# Patient Record
Sex: FEMALE | Race: WHITE | Employment: FULL TIME | URBAN - METROPOLITAN AREA
[De-identification: names, ages, dates, MRNs, and addresses within clinical notes are randomized per-mention and may not be internally consistent; named-entity substitution may affect disease eponyms.]

---

## 2017-05-02 DIAGNOSIS — G35 MULTIPLE SCLEROSIS (HCC): Primary | ICD-10-CM

## 2017-05-02 RX ORDER — GLATIRAMER 40 MG/ML
40 INJECTION, SOLUTION SUBCUTANEOUS
Qty: 12 SYRINGE | Refills: 0 | Status: SHIPPED | OUTPATIENT
Start: 2017-05-03 | End: 2017-05-26

## 2017-05-02 NOTE — TELEPHONE ENCOUNTER
Spoke with Julissa Lim, pharmacist. States that they filled her last Copaxone Rx last month and are asking for a refill to be proactive. Not wanting to delay future shipments. Informed him we will sent to provider for review. Verbalized understanding.      [facility]

## 2017-05-26 DIAGNOSIS — G35 MULTIPLE SCLEROSIS (HCC): Primary | ICD-10-CM

## 2017-05-26 NOTE — TELEPHONE ENCOUNTER
Left message:    Appointment is due for refills; please assist her in making appointment and notify nurse.      Medication: Copaxone    Date of last refill: 05/02/17:  #12 with 0 refills  [place]    Last office visit:

## 2017-05-30 RX ORDER — GLATIRAMER 40 MG/ML
40 INJECTION, SOLUTION SUBCUTANEOUS
Qty: 12 SYRINGE | Refills: 0 | Status: SHIPPED | OUTPATIENT
Start: 2017-05-31 | End: 2017-07-17

## 2017-06-05 ENCOUNTER — OFFICE VISIT (OUTPATIENT)
Dept: NEUROLOGY | Facility: CLINIC | Age: 32
End: 2017-06-05

## 2017-06-05 VITALS
DIASTOLIC BLOOD PRESSURE: 70 MMHG | SYSTOLIC BLOOD PRESSURE: 120 MMHG | WEIGHT: 127 LBS | RESPIRATION RATE: 18 BRPM | HEART RATE: 80 BPM | BODY MASS INDEX: 20 KG/M2

## 2017-06-05 DIAGNOSIS — G35 MULTIPLE SCLEROSIS (HCC): Primary | ICD-10-CM

## 2017-06-05 DIAGNOSIS — H46.9 OPTIC NEURITIS: ICD-10-CM

## 2017-06-05 PROCEDURE — 99213 OFFICE O/P EST LOW 20 MIN: CPT | Performed by: OTHER

## 2017-06-05 NOTE — PROGRESS NOTES
Neurology H&P    Mckinley Kebede Patient Status:  No patient class for patient encounter    5/3/1985 MRN [id number]   Location [facility] Attending No att. providers found   Hosp Day #  PCP LYNETTE Mendiola Van     Subjective:  Mckinley Kebede extremity swelling  GI: no constipation or abdominal pain  : denies frequent urination or difficulty urinating   Heme: no new bruising, no unexplained fevers or chills  Endocrine: no unexplained weight loss or gain, no new fatigue  Musculoskeletal: denie within normal limits.  There is no midline shift or mass effect.  The basal cisterns are patent.  The gray-white matter differentiation is intact.  The craniocervical junction is unremarkable.         There are focal areas of T2/FLAIR hyperintensity in the refills today  - We discussed pregnancy and MS She will contact me prior to planning any future pregnancy or as soon as she knows if she is pregnant to discuss MS and Copaxone further  - Continue to follow up with opthalmology and PCP  - Offered 6 month fo

## 2017-06-05 NOTE — PATIENT INSTRUCTIONS
Refill policies:    • Allow 2-3 business days for refills; controlled substances may take longer.   • Contact your pharmacy at least 5 days prior to running out of medication and have them send an electronic request or submit request through the [facility] have a procedure or additional testing performed. [facility]) will contact your insurance carrier to obtain pre-certification or prior authorization.     Unfortunately, MARLEY has seen an increase in denial of payment even though the p

## 2017-06-26 ENCOUNTER — TELEPHONE (OUTPATIENT)
Dept: NEUROLOGY | Facility: CLINIC | Age: 32
End: 2017-06-26

## 2017-06-26 NOTE — TELEPHONE ENCOUNTER
Not[facility] that [email or web address] receipt confirmation. This is the pharmacy to which previous copaxone Rxs were sent.   Spoke with Brenda, who states that last fill for pt was on 5/19, and that they

## 2017-07-17 DIAGNOSIS — G35 MULTIPLE SCLEROSIS (HCC): ICD-10-CM

## 2017-07-17 RX ORDER — GLATIRAMER 40 MG/ML
40 INJECTION, SOLUTION SUBCUTANEOUS
Qty: 12 SYRINGE | Refills: 11 | Status: SHIPPED | OUTPATIENT
Start: 2017-07-17 | End: 2018-01-09

## 2017-07-17 NOTE — TELEPHONE ENCOUNTER
Pt called to state that no refills were given on her last Copaxone refill (see most recent reason for call on TE dated 6/26/17). Note that the request had initially had no refills, as pt was overdue for follow up.   Pt stated that she didn't need refills a

## 2018-01-08 NOTE — TELEPHONE ENCOUNTER
Received a call from [place] indicating that they are sending her Copaxone RX 40 mg to local [facility] number [phone]

## 2018-01-09 ENCOUNTER — TELEPHONE (OUTPATIENT)
Dept: NEUROLOGY | Facility: CLINIC | Age: 33
End: 2018-01-09

## 2018-01-09 DIAGNOSIS — G35 MULTIPLE SCLEROSIS (HCC): ICD-10-CM

## 2018-01-09 RX ORDER — GLATIRAMER 40 MG/ML
40 INJECTION, SOLUTION SUBCUTANEOUS
Qty: 12 SYRINGE | Refills: 6 | Status: SHIPPED | OUTPATIENT
Start: 2018-01-10 | End: 2018-01-09

## 2018-01-09 RX ORDER — GLATIRAMER ACETATE 40 MG/ML
40 INJECTION, SOLUTION SUBCUTANEOUS
Qty: 12 SYRINGE | Refills: 6 | Status: SHIPPED | OUTPATIENT
Start: 2018-01-10 | End: 2018-07-12

## 2018-01-09 NOTE — TELEPHONE ENCOUNTER
Copaxone prescription was refilled on 07/17/17 with 1 year refills. Sent prescription to [facility], since prescription is still valid.

## 2018-01-09 NOTE — TELEPHONE ENCOUNTER
Patient calling stating Bev Abrams informed her generic Copaxone was prescribed. Reordered to reflect to dispense as written/brand name only.

## 2018-01-09 NOTE — TELEPHONE ENCOUNTER
Patient informed she changed insurance companies and needs refill of Copaxone sent to [facility]. Patient unable to provide information from insurance prescription card (i.e. BIN/PCN/Grp).      Informed patient a prior authorization will need to be

## 2018-04-10 ENCOUNTER — TELEPHONE (OUTPATIENT)
Dept: NEUROLOGY | Facility: CLINIC | Age: 33
End: 2018-04-10

## 2018-04-10 NOTE — TELEPHONE ENCOUNTER
Per Epic review, pts LOV was on 6/5/17. Most recent Copaxone Rx was written on 1/10/18 with #12/6. This should last for 24 weeks, through the end of June. Spoke with patient and relayed above.   Pt also informed that she should contact her specialty ph

## 2018-04-10 NOTE — TELEPHONE ENCOUNTER
[place] she [place] to come back for yearly med checkup. Wants to know when rx  & when she should refill it?

## 2018-05-23 ENCOUNTER — TELEPHONE (OUTPATIENT)
Dept: NEUROLOGY | Facility: CLINIC | Age: 33
End: 2018-05-23

## 2018-05-24 NOTE — TELEPHONE ENCOUNTER
Spoke with patient and relayed that letter was ready. Pt requested letter be mailed to her home address. Letter placed in outgoing mail.

## 2018-07-12 DIAGNOSIS — G35 MULTIPLE SCLEROSIS (HCC): ICD-10-CM

## 2018-07-12 RX ORDER — GLATIRAMER ACETATE 40 MG/ML
40 INJECTION, SOLUTION SUBCUTANEOUS
Qty: 12 SYRINGE | Refills: 1 | Status: SHIPPED | OUTPATIENT
Start: 2018-07-13 | End: 2018-07-16

## 2018-07-12 NOTE — TELEPHONE ENCOUNTER
Spoke with patient who scheduled an appointment for 8/29/18. Appointment placed on hold for  to schedule.     Medication: Copaxone 40 mg/mL    Date of last refill: 1/10/18  [place]    Last office visit: 6/5/

## 2018-07-16 RX ORDER — GLATIRAMER ACETATE 40 MG/ML
40 INJECTION, SOLUTION SUBCUTANEOUS
Qty: 12 SYRINGE | Refills: 1 | Status: SHIPPED | OUTPATIENT
Start: 2018-07-16 | End: 2018-08-29

## 2018-08-29 ENCOUNTER — APPOINTMENT (OUTPATIENT)
Dept: LAB | Age: 33
End: 2018-08-29
Attending: Other
Payer: COMMERCIAL

## 2018-08-29 ENCOUNTER — OFFICE VISIT (OUTPATIENT)
Dept: NEUROLOGY | Facility: CLINIC | Age: 33
End: 2018-08-29
Payer: COMMERCIAL

## 2018-08-29 VITALS
HEART RATE: 62 BPM | SYSTOLIC BLOOD PRESSURE: 110 MMHG | RESPIRATION RATE: 14 BRPM | WEIGHT: 130 LBS | DIASTOLIC BLOOD PRESSURE: 70 MMHG | BODY MASS INDEX: 21 KG/M2

## 2018-08-29 DIAGNOSIS — G35 MULTIPLE SCLEROSIS (HCC): Primary | ICD-10-CM

## 2018-08-29 DIAGNOSIS — H46.9 OPTIC NEURITIS: ICD-10-CM

## 2018-08-29 DIAGNOSIS — G35 MULTIPLE SCLEROSIS (HCC): ICD-10-CM

## 2018-08-29 PROCEDURE — 36415 COLL VENOUS BLD VENIPUNCTURE: CPT | Performed by: OTHER

## 2018-08-29 PROCEDURE — 99213 OFFICE O/P EST LOW 20 MIN: CPT | Performed by: OTHER

## 2018-08-29 PROCEDURE — 82652 VIT D 1 25-DIHYDROXY: CPT | Performed by: OTHER

## 2018-08-29 RX ORDER — GLATIRAMER ACETATE 40 MG/ML
40 INJECTION, SOLUTION SUBCUTANEOUS
Qty: 12 SYRINGE | Refills: 12 | Status: SHIPPED | OUTPATIENT
Start: 2018-08-29 | End: 2019-05-13

## 2018-08-29 NOTE — PROGRESS NOTES
Neurology H&P    Deepika Hicks Patient Status:  No patient class for patient encounter    5/3/1985 MRN [id number]   Location [facility] Attending No att. providers found   Hosp Day #  PCP LYNETTE Gamble     Subjective:  Deepika Hicks denies back pain, denies joint pain  Psych: denies depression   Skin: denies any new masses, rashes, lumps or bruising    Objective/Physical Exam:    Vital Signs: There were no vitals taken for this visit.     HEENT: moist mucus membranes  Cardiovascular: in the supra-and infratentorial white matter which are likely related to the patient's history of demyelinating disease. No definite enhancing plaque is seen to suggest active demyelination. There is no    significant overall brain parenchymal volume loss. [place], [place]  Neurology

## 2018-08-30 LAB — 1,25-DIHYDROXYVITAMIN D: 49.1 PG/ML

## 2018-09-10 ENCOUNTER — OFFICE VISIT (OUTPATIENT)
Dept: HEMATOLOGY/ONCOLOGY | Facility: HOSPITAL | Age: 33
End: 2018-09-10
Attending: Other
Payer: COMMERCIAL

## 2018-09-10 ENCOUNTER — TELEPHONE (OUTPATIENT)
Dept: NEUROLOGY | Facility: CLINIC | Age: 33
End: 2018-09-10

## 2018-09-10 ENCOUNTER — TELEPHONE (OUTPATIENT)
Dept: HEMATOLOGY/ONCOLOGY | Facility: HOSPITAL | Age: 33
End: 2018-09-10

## 2018-09-10 VITALS
WEIGHT: 127.19 LBS | TEMPERATURE: 99 F | HEART RATE: 71 BPM | RESPIRATION RATE: 18 BRPM | OXYGEN SATURATION: 99 % | DIASTOLIC BLOOD PRESSURE: 82 MMHG | BODY MASS INDEX: 20 KG/M2 | SYSTOLIC BLOOD PRESSURE: 132 MMHG

## 2018-09-10 DIAGNOSIS — H46.9 OPTIC NEURITIS: Primary | ICD-10-CM

## 2018-09-10 DIAGNOSIS — G35 MULTIPLE SCLEROSIS (HCC): ICD-10-CM

## 2018-09-10 DIAGNOSIS — G35 MULTIPLE SCLEROSIS (HCC): Primary | ICD-10-CM

## 2018-09-10 PROCEDURE — 96365 THER/PROPH/DIAG IV INF INIT: CPT

## 2018-09-10 RX ORDER — PREDNISONE 20 MG/1
TABLET ORAL
Qty: 30 TABLET | Refills: 0 | Status: SHIPPED | OUTPATIENT
Start: 2018-09-10 | End: 2019-08-27 | Stop reason: ALTCHOICE

## 2018-09-10 NOTE — TELEPHONE ENCOUNTER
Spoke with patient who states she has been experiencing severe left eye pain and foggy vision since Friday 9/7/18  Patient stated her left eye pain has been worsening since Friday and  is worse today.    Patient states she previously had Optic neuritis in h

## 2018-09-10 NOTE — PROGRESS NOTES
Education Record    Learner:  Patient and Spouse    Disease / Diagnosis: MS Exacerbation Optic Neuritis    Barriers / Limitations:  None   Comments:    Method:  Brief focused and Reinforcement   Comments:    General Topics:  Plan of care reviewed   Comment

## 2018-09-10 NOTE — TELEPHONE ENCOUNTER
Received a call from Dr. Chavez [place] office for the patient to receive Solumedrol 1 gram x 5 days. Spoke to patient's  and was given appointment for today at 36 and will ne given the rest of her appointment when she gets here.  Spouse verbalized und

## 2018-09-10 NOTE — TELEPHONE ENCOUNTER
Called patient Kellee Grandchild [phone] and spoke with [facility] for codes  no authorization or predetermination is needed. The [id number] I have to talk to the [facility] Department ([phone])     Call reference #[id number].  Time on call 30:47    I was tr

## 2018-09-10 NOTE — TELEPHONE ENCOUNTER
I called Ms. Vivar on the phone today,. She states that on Saturday (2 days ago). She states that her L eye has painful eye movements and blurry vision.  I asked that she go to ED for admission and 3-5 days of IV solumedrol (1g per day) treatment, She st

## 2018-09-10 NOTE — TELEPHONE ENCOUNTER
Referral placed for IV Solumedrol 1 g over 5 days at [facility]. PA notified to initiate authorization.  Once authorized need to enter Therapy plan and notify Charge nurse at [facility] to schedule patient if possible to start today or tomorr

## 2018-09-10 NOTE — TELEPHONE ENCOUNTER
Therapy plan placed for IV Solumedrol 1 g for 5 days. Siddharth Yeh RN @ [phone] [phone] notified. Verbalized understanding. She will call patient to start infusions today.        Left a detailed message on Rohan (patient's 's) confide

## 2018-09-10 NOTE — TELEPHONE ENCOUNTER
Patient's  informed of prednisone taper to be started after Solumedrol infusions. He verbalized understanding. Medication sent to [facility].  Receipt confirmed by pharmacy (9/10/2018 11:15 AM CDT)

## 2018-09-11 ENCOUNTER — OFFICE VISIT (OUTPATIENT)
Dept: HEMATOLOGY/ONCOLOGY | Facility: HOSPITAL | Age: 33
End: 2018-09-11
Attending: Other
Payer: COMMERCIAL

## 2018-09-11 ENCOUNTER — TELEPHONE (OUTPATIENT)
Dept: NEUROLOGY | Facility: CLINIC | Age: 33
End: 2018-09-11

## 2018-09-11 VITALS
HEART RATE: 78 BPM | SYSTOLIC BLOOD PRESSURE: 129 MMHG | TEMPERATURE: 99 F | OXYGEN SATURATION: 100 % | RESPIRATION RATE: 16 BRPM | DIASTOLIC BLOOD PRESSURE: 75 MMHG

## 2018-09-11 DIAGNOSIS — H46.9 OPTIC NEURITIS: Primary | ICD-10-CM

## 2018-09-11 DIAGNOSIS — G35 MULTIPLE SCLEROSIS (HCC): ICD-10-CM

## 2018-09-11 PROCEDURE — 96365 THER/PROPH/DIAG IV INF INIT: CPT

## 2018-09-11 NOTE — PROGRESS NOTES
Education Record    Learner:  Patient    Disease / Diagnosis: MS - solumedrol infusion #2 of 3 today.      Barriers / Limitations:  None   Comments:    Method:  Brief focused   Comments:    General Topics:  Plan of care reviewed   Comments:    Outcome:  Janette

## 2018-09-12 ENCOUNTER — OFFICE VISIT (OUTPATIENT)
Dept: HEMATOLOGY/ONCOLOGY | Facility: HOSPITAL | Age: 33
End: 2018-09-12
Attending: Other
Payer: COMMERCIAL

## 2018-09-12 VITALS
HEART RATE: 72 BPM | TEMPERATURE: 98 F | DIASTOLIC BLOOD PRESSURE: 76 MMHG | SYSTOLIC BLOOD PRESSURE: 121 MMHG | RESPIRATION RATE: 16 BRPM

## 2018-09-12 DIAGNOSIS — H46.9 OPTIC NEURITIS: Primary | ICD-10-CM

## 2018-09-12 DIAGNOSIS — G35 MULTIPLE SCLEROSIS (HCC): ICD-10-CM

## 2018-09-12 PROCEDURE — 96365 THER/PROPH/DIAG IV INF INIT: CPT

## 2018-09-12 NOTE — PROGRESS NOTES
Education Record    Learner:  Patient    Disease / Diagnosis:MS    Barriers / Limitations:  None   Comments:    Method:  Discussion   Comments:    General Topics:  Medication, Side effects and symptom management, Plan of care reviewed and Fall risk and pre

## 2018-09-13 ENCOUNTER — OFFICE VISIT (OUTPATIENT)
Dept: HEMATOLOGY/ONCOLOGY | Facility: HOSPITAL | Age: 33
End: 2018-09-13
Attending: Other
Payer: COMMERCIAL

## 2018-09-13 VITALS
TEMPERATURE: 98 F | RESPIRATION RATE: 18 BRPM | OXYGEN SATURATION: 100 % | SYSTOLIC BLOOD PRESSURE: 119 MMHG | DIASTOLIC BLOOD PRESSURE: 78 MMHG | HEART RATE: 74 BPM

## 2018-09-13 DIAGNOSIS — H46.9 OPTIC NEURITIS: Primary | ICD-10-CM

## 2018-09-13 DIAGNOSIS — G35 MULTIPLE SCLEROSIS (HCC): ICD-10-CM

## 2018-09-13 PROCEDURE — 96365 THER/PROPH/DIAG IV INF INIT: CPT

## 2018-09-13 NOTE — PROGRESS NOTES
Pt tolerated solumedrol 1 gram infusion well today without incident or complaint.      Education Record    Learner:  Patient    Disease / Diagnosis: MS; optic neuritis     Barriers / Limitations:  None   Comments:    Method:  Discussion   Comments:    Gener

## 2018-09-14 ENCOUNTER — OFFICE VISIT (OUTPATIENT)
Dept: HEMATOLOGY/ONCOLOGY | Facility: HOSPITAL | Age: 33
End: 2018-09-14
Attending: Other
Payer: COMMERCIAL

## 2018-09-14 VITALS
DIASTOLIC BLOOD PRESSURE: 74 MMHG | HEART RATE: 71 BPM | RESPIRATION RATE: 16 BRPM | SYSTOLIC BLOOD PRESSURE: 120 MMHG | TEMPERATURE: 97 F | OXYGEN SATURATION: 100 %

## 2018-09-14 DIAGNOSIS — G35 MULTIPLE SCLEROSIS (HCC): ICD-10-CM

## 2018-09-14 DIAGNOSIS — H46.9 OPTIC NEURITIS: Primary | ICD-10-CM

## 2018-09-14 PROCEDURE — 96365 THER/PROPH/DIAG IV INF INIT: CPT

## 2018-09-14 NOTE — PROGRESS NOTES
Education Record    Learner:  Patient    Disease / Diagnosis: Patient here for last day of solumedrol infusion for MS.      Barriers / Limitations:  None   Comments:    Method:  Brief focused   Comments:    General Topics:  Plan of care reviewed   Comments:

## 2018-09-28 ENCOUNTER — HOSPITAL ENCOUNTER (OUTPATIENT)
Dept: MRI IMAGING | Facility: HOSPITAL | Age: 33
Discharge: HOME | End: 2018-09-28
Attending: Other
Payer: COMMERCIAL

## 2018-09-28 DIAGNOSIS — G35 MULTIPLE SCLEROSIS (HCC): ICD-10-CM

## 2018-09-28 PROCEDURE — 70543 MRI ORBT/FAC/NCK W/O &W/DYE: CPT | Performed by: OTHER

## 2018-09-28 PROCEDURE — 70553 MRI BRAIN STEM W/O & W/DYE: CPT | Performed by: OTHER

## 2018-09-28 PROCEDURE — A9575 INJ GADOTERATE MEGLUMI 0.1ML: HCPCS | Performed by: OTHER

## 2018-10-02 ENCOUNTER — TELEPHONE (OUTPATIENT)
Dept: NEUROLOGY | Facility: CLINIC | Age: 33
End: 2018-10-02

## 2018-10-02 NOTE — TELEPHONE ENCOUNTER
[facility] message sent to patient with the below results. ----- Message from Korey Donato DO sent at 10/2/2018 12:10 PM CDT -----  MRI Brain reviewed. No new enhancing lesions.  This is actually improved form her previous exam

## 2019-04-02 ENCOUNTER — TELEPHONE (OUTPATIENT)
Dept: INTERNAL MEDICINE CLINIC | Facility: CLINIC | Age: 34
End: 2019-04-02

## 2019-04-02 NOTE — TELEPHONE ENCOUNTER
Per Epic review, patient is active on MyChart. Per notes below, VIT D level is WNL. Patient notified.

## 2019-05-06 ENCOUNTER — TELEPHONE (OUTPATIENT)
Dept: NEUROLOGY | Facility: CLINIC | Age: 34
End: 2019-05-06

## 2019-05-06 DIAGNOSIS — G35 MULTIPLE SCLEROSIS (HCC): Primary | ICD-10-CM

## 2019-05-06 RX ORDER — GLATIRAMER 40 MG/ML
40 INJECTION, SOLUTION SUBCUTANEOUS
Qty: 12 SYRINGE | Refills: 3 | Status: CANCELLED | OUTPATIENT
Start: 2019-05-06 | End: 2019-06-03

## 2019-05-06 NOTE — TELEPHONE ENCOUNTER
LOV 8/29/18, to return in 1 year. Glatiramer 40mg pended for review. Prednisone was given just as a taper back in 9/2018 after Solumedrol infusion, not maintenance medication. LMTCB with , Nito (ok per HIPAA).     -can pend generic Copaxone a

## 2019-05-07 NOTE — TELEPHONE ENCOUNTER
Patient's  called MARLEY back (ok per HIPPA). Patient has new insurance and will update MARLEY as soon as he is home. Informed patient's spouse that RNs will speak to provider regarding brand Copaxone vs generic.  MARLEY will have to discuss with new insur

## 2019-05-10 RX ORDER — GLATIRAMER 40 MG/ML
40 INJECTION, SOLUTION SUBCUTANEOUS
Qty: 9 SYRINGE | Refills: 5 | Status: SHIPPED | OUTPATIENT
Start: 2019-05-10 | End: 2019-05-13

## 2019-05-10 NOTE — TELEPHONE ENCOUNTER
RN attempted to start PA on CMM and patient reported numbers for new insurance were not allowing an online submission. Called [facility] at [phone] and initiated a verbal PA. PA reference # is [id number].     Per PA team at [facility] - member # should

## 2019-05-10 NOTE — TELEPHONE ENCOUNTER
Attempted PA for brand Copaxone was denied. Submitted claim for generic Glatiramer was approved for 5 years through 5/7/2024. [address] [id number]. Plan covers 9 syringes in 21 day period. Pended order to get patient to annual visit in August 2019.     Per [employer]

## 2019-05-13 RX ORDER — GLATIRAMER 40 MG/ML
40 INJECTION, SOLUTION SUBCUTANEOUS
Qty: 12 SYRINGE | Refills: 5 | COMMUNITY
Start: 2019-05-13 | End: 2019-10-28

## 2019-05-13 NOTE — TELEPHONE ENCOUNTER
Received call from [facility] stating that Rx was for 9 syringes ( see notes below, per PA only would cover 9 syringes). [facility] informed of PA notification and states they will not break a box and will need new RX and will discuss with patient's insurance.  Mar

## 2019-08-27 ENCOUNTER — OFFICE VISIT (OUTPATIENT)
Dept: NEUROLOGY | Facility: CLINIC | Age: 34
End: 2019-08-27
Payer: COMMERCIAL

## 2019-08-27 VITALS
WEIGHT: 128 LBS | DIASTOLIC BLOOD PRESSURE: 74 MMHG | RESPIRATION RATE: 16 BRPM | BODY MASS INDEX: 21 KG/M2 | SYSTOLIC BLOOD PRESSURE: 112 MMHG | HEART RATE: 72 BPM

## 2019-08-27 DIAGNOSIS — G35 MULTIPLE SCLEROSIS (HCC): Primary | ICD-10-CM

## 2019-08-27 PROCEDURE — 99213 OFFICE O/P EST LOW 20 MIN: CPT | Performed by: OTHER

## 2019-08-27 NOTE — PROGRESS NOTES
Neurology H&P    Marc Infante Patient Status:  No patient class for patient encounter    5/3/1985 MRN [id number]   Location ED [facility] Attending No att. providers found   Hosp Day #  PCP LYNETTE Arreola     Subjective:  Marc Infante SOB, no new cough  CV: no chest pain, no new lower extremity swelling  GI: no constipation or abdominal pain  : denies frequent urination or difficulty urinating   Heme: no new bruising, no unexplained fevers or chills  Endocrine: no unexplained weight l    attempted to decrease the extent of artifact.      The ventricles and sulci are within normal limits.  There is no midline shift or mass effect.  The basal cisterns are patent.  The gray-white matter differentiation is intact.  The craniocervical juncti 8/2018    Plan:  1. Relapsing remitting MS w/ 2 bouts of R eye ON. Aquaporin-4 neg. - Continue Copaxone 40mg 3 times weekly. - She is interested in switching to an oral MS agent.        - Information given  - Continue to follow up with opthalmology and P

## 2019-10-28 DIAGNOSIS — G35 MULTIPLE SCLEROSIS (HCC): ICD-10-CM

## 2019-10-29 RX ORDER — GLATIRAMER 40 MG/ML
INJECTION, SOLUTION SUBCUTANEOUS
Qty: 12 SYRINGE | Refills: 5 | Status: SHIPPED | OUTPATIENT
Start: 2019-10-29 | End: 2020-01-09

## 2019-10-29 NOTE — TELEPHONE ENCOUNTER
Medication: GLATIRAMER ACETATE 40 MG/ML Subcutaneous Solution Prefilled Syringe    Date of last refill: 05/13/19 (#12 syringe/5)  [place]    Last office visit: 8/27/2019  Due back to clinic per last office note:  Amanda Metzger

## 2019-12-02 ENCOUNTER — TELEPHONE (OUTPATIENT)
Dept: NEUROLOGY | Facility: CLINIC | Age: 34
End: 2019-12-02

## 2019-12-02 NOTE — TELEPHONE ENCOUNTER
Fax received from [facility] stating they have been unable to reach patient to fill Glatiramer. Message left on VM for patient to call [facility].

## 2019-12-26 ENCOUNTER — TELEPHONE (OUTPATIENT)
Dept: NEUROLOGY | Facility: CLINIC | Age: 34
End: 2019-12-26

## 2019-12-26 NOTE — TELEPHONE ENCOUNTER
Incoming fax asking for refill. Per Epic review, given 6 month refill on 10/29/19, should still have refills left. Faxed info back to pharmacy. Confirmation received.

## 2020-01-09 DIAGNOSIS — G35 MULTIPLE SCLEROSIS (HCC): ICD-10-CM

## 2020-01-09 RX ORDER — GLATIRAMER 40 MG/ML
40 INJECTION, SOLUTION SUBCUTANEOUS
Qty: 12 SYRINGE | Refills: 5 | Status: SHIPPED | OUTPATIENT
Start: 2020-01-10 | End: 2020-07-09

## 2020-01-09 NOTE — TELEPHONE ENCOUNTER
[facility] calling to request refill of GLATIRAMER ACETATE 40 MG/ML Subcutaneous Solution Prefilled Syringe. Last refill was sent to [facility] on 10/29/2019 for 12 syringe with 5 refills. [facility] will be handling refills for pt.     Order pended and

## 2020-01-20 ENCOUNTER — TELEPHONE (OUTPATIENT)
Dept: NEUROLOGY | Facility: CLINIC | Age: 35
End: 2020-01-20

## 2020-07-08 DIAGNOSIS — G35 MULTIPLE SCLEROSIS (HCC): ICD-10-CM

## 2020-07-09 RX ORDER — GLATIRAMER 40 MG/ML
INJECTION, SOLUTION SUBCUTANEOUS
Qty: 12 SYRINGE | Refills: 2 | Status: SHIPPED | OUTPATIENT
Start: 2020-07-09 | End: 2020-09-25

## 2020-07-09 NOTE — TELEPHONE ENCOUNTER
Medication: GLATIRAMER ACETATE 40 MG/ML Subcutaneous Solution Prefilled Syringe    Date of last refill: 01/10/2020 (#12 syringe/5)  [place]    Last office visit: 08/27/19  Due back to clinic per last office note:  Ar

## 2020-09-25 DIAGNOSIS — G35 MULTIPLE SCLEROSIS (HCC): ICD-10-CM

## 2020-09-25 RX ORDER — GLATIRAMER 40 MG/ML
40 INJECTION, SOLUTION SUBCUTANEOUS
Qty: 12 SYRINGE | Refills: 1 | Status: SHIPPED | OUTPATIENT
Start: 2020-09-25 | End: 2020-11-11

## 2020-09-25 NOTE — TELEPHONE ENCOUNTER
Kareem't scheduled on 11/4.     Medication: Cruz Ortega    Date of last refill: 7/9/20 (#12/2)  [place]     Last office visit: 8/27/19  Due back to clinic per last office note:  1 year  Date next office visit scheduled:  No f

## 2020-11-04 ENCOUNTER — TELEPHONE (OUTPATIENT)
Dept: NEUROLOGY | Facility: CLINIC | Age: 35
End: 2020-11-04

## 2020-11-11 DIAGNOSIS — G35 MULTIPLE SCLEROSIS (HCC): ICD-10-CM

## 2020-11-11 RX ORDER — GLATIRAMER 40 MG/ML
INJECTION, SOLUTION SUBCUTANEOUS
Qty: 12 SYRINGE | Refills: 1 | Status: SHIPPED | OUTPATIENT
Start: 2020-11-11 | End: 2020-12-07

## 2020-11-11 NOTE — TELEPHONE ENCOUNTER
Medication: GLATIRAMER ACETATE 40 MG/ML Subcutaneous Solution Prefilled Syringe    Date of last refill: 09/25/2020 (#12 syringe/1)  [place]    Last office visit: 08/27/19  Due back to clinic per last office note:  Ar

## 2020-12-07 ENCOUNTER — TELEMEDICINE (OUTPATIENT)
Dept: NEUROLOGY | Facility: CLINIC | Age: 35
End: 2020-12-07
Payer: COMMERCIAL

## 2020-12-07 DIAGNOSIS — H46.9 OPTIC NEURITIS: Primary | ICD-10-CM

## 2020-12-07 DIAGNOSIS — G35 MULTIPLE SCLEROSIS (HCC): ICD-10-CM

## 2020-12-07 PROCEDURE — 99213 OFFICE O/P EST LOW 20 MIN: CPT | Performed by: OTHER

## 2020-12-07 RX ORDER — GLATIRAMER 40 MG/ML
1 INJECTION, SOLUTION SUBCUTANEOUS
Qty: 12 SYRINGE | Refills: 11 | Status: SHIPPED | OUTPATIENT
Start: 2020-12-07 | End: 2020-12-09

## 2020-12-07 NOTE — PROGRESS NOTES
This patient verbally consents a video and audio Check-In service today. Patient understands and accepts financial responsibility for any deductible, co-insurance and/or co-pays associated with this service.       Neurology H&P    Jayy Valladares No      Family History:  Family History   Problem Relation Age of Onset   • Hypertension Mother        ROS:  10 point ROS completed and pertinent positives in HPI    Objective/Physical Exam: exam limited by video visit    Vital Signs:   There were no vitals ethmoid mucosal thickening.  The remainder of the visualized paranasal sinuses and mastoid air cells are unremarkable.  The expected major intracranial flow voids are present.      The orbits are limited in evaluation due to extensive susceptibility artifac spent on reviewing labs, medications, radiology tests and decision making.   Appropriate medical decision-making and tests are ordered as detailed in the plan of care above.”      Clemencia Dan, DO  Neurology

## 2020-12-09 ENCOUNTER — TELEPHONE (OUTPATIENT)
Dept: NEUROLOGY | Facility: CLINIC | Age: 35
End: 2020-12-09

## 2020-12-09 DIAGNOSIS — G35 MULTIPLE SCLEROSIS (HCC): ICD-10-CM

## 2020-12-09 RX ORDER — GLATIRAMER 40 MG/ML
1 INJECTION, SOLUTION SUBCUTANEOUS
Qty: 12 SYRINGE | Refills: 11 | COMMUNITY
Start: 2020-12-09 | End: 2021-11-22

## 2020-12-09 NOTE — TELEPHONE ENCOUNTER
Spoke with [facility] pharmacist  Radha Grijalva) and informed her the sig should be Administer 1 ml 3 times weekly. Sig updated in Epic historically.

## 2021-01-12 ENCOUNTER — V-VISIT (OUTPATIENT)
Dept: INTERNAL MEDICINE | Age: 36
End: 2021-01-12

## 2021-01-12 ENCOUNTER — E-ADVICE (OUTPATIENT)
Dept: INTERNAL MEDICINE | Age: 36
End: 2021-01-12

## 2021-01-12 ENCOUNTER — TELEPHONE (OUTPATIENT)
Dept: INTERNAL MEDICINE | Age: 36
End: 2021-01-12

## 2021-01-12 DIAGNOSIS — E55.9 VITAMIN D DEFICIENCY: ICD-10-CM

## 2021-01-12 DIAGNOSIS — G35 MULTIPLE SCLEROSIS (CMD): Primary | ICD-10-CM

## 2021-01-12 PROBLEM — H46.9 OPTIC NEURITIS: Status: ACTIVE | Noted: 2021-01-12

## 2021-01-12 PROBLEM — F32.A DEPRESSION: Status: ACTIVE | Noted: 2021-01-12

## 2021-01-12 PROCEDURE — 99202 OFFICE O/P NEW SF 15 MIN: CPT | Performed by: FAMILY MEDICINE

## 2021-01-12 RX ORDER — CALCIUM CITRATE/VITAMIN D3 200MG-6.25
TABLET ORAL
COMMUNITY

## 2021-01-12 RX ORDER — GLATIRAMER 40 MG/ML
INJECTION, SOLUTION SUBCUTANEOUS
COMMUNITY
Start: 2020-12-16

## 2021-01-12 SDOH — HEALTH STABILITY: MENTAL HEALTH: HOW OFTEN DO YOU HAVE A DRINK CONTAINING ALCOHOL?: NOT ASKED

## 2021-01-12 ASSESSMENT — PATIENT HEALTH QUESTIONNAIRE - PHQ9
2. FEELING DOWN, DEPRESSED OR HOPELESS: NOT AT ALL
CLINICAL INTERPRETATION OF PHQ2 SCORE: NO FURTHER SCREENING NEEDED
1. LITTLE INTEREST OR PLEASURE IN DOING THINGS: NOT AT ALL
CLINICAL INTERPRETATION OF PHQ9 SCORE: NO FURTHER SCREENING NEEDED
SUM OF ALL RESPONSES TO PHQ9 QUESTIONS 1 AND 2: 0
SUM OF ALL RESPONSES TO PHQ9 QUESTIONS 1 AND 2: 0

## 2021-01-19 ENCOUNTER — E-ADVICE (OUTPATIENT)
Dept: INTERNAL MEDICINE | Age: 36
End: 2021-01-19

## 2021-01-20 ENCOUNTER — TELEPHONE (OUTPATIENT)
Dept: NEUROLOGY | Facility: CLINIC | Age: 36
End: 2021-01-20

## 2021-05-24 ENCOUNTER — TELEPHONE (OUTPATIENT)
Dept: NEUROLOGY | Facility: CLINIC | Age: 36
End: 2021-05-24

## 2021-05-24 NOTE — TELEPHONE ENCOUNTER
Patient informed there is no contra-indication of patient taking Glatiramer Acetate while pregnant per MS web site but patient requests Dr Radha Ashby input.

## 2021-05-24 NOTE — TELEPHONE ENCOUNTER
Pt has been taking Glatiramer Acetate 40 MG/ML for several years, just found out over the weekend that she is pregnant. Her next dose is this evening, does she have to stop taking it? What can she do instead? Please advise.

## 2021-05-24 NOTE — TELEPHONE ENCOUNTER
There are studies that have retrospectively looked at Copaxone during pregnancy.  Overall with more azul 7000 woman exposed to copaxone during pregnancy  And the data suggested that they were not at higher risk for congenital anomalies than what is expected

## 2021-08-11 ENCOUNTER — TELEPHONE (OUTPATIENT)
Dept: NEUROLOGY | Facility: CLINIC | Age: 36
End: 2021-08-11

## 2021-08-11 NOTE — TELEPHONE ENCOUNTER
Patient notified that [facility] has been trying to reach her to schedule Glatiramer delivery.  Patient states she will call them,

## 2021-11-21 DIAGNOSIS — G35 MULTIPLE SCLEROSIS (HCC): ICD-10-CM

## 2021-11-22 RX ORDER — GLATIRAMER 40 MG/ML
1 INJECTION, SOLUTION SUBCUTANEOUS
Qty: 12 EACH | Refills: 0 | Status: SHIPPED | OUTPATIENT
Start: 2021-11-22 | End: 2021-12-02

## 2021-11-22 NOTE — TELEPHONE ENCOUNTER
Sent patient [employer] customer insightt message that appointment needed for further refills.      Medication: GLATIRAMER ACETATE 40 MG/ML Subcutaneous Solution Prefilled Syringe     Date of last refill: 12/9/20 (#12/0)  [place]     Last

## 2021-11-23 ENCOUNTER — TELEPHONE (OUTPATIENT)
Dept: NEUROLOGY | Facility: CLINIC | Age: 36
End: 2021-11-23

## 2021-12-02 ENCOUNTER — PATIENT MESSAGE (OUTPATIENT)
Dept: NEUROLOGY | Facility: CLINIC | Age: 36
End: 2021-12-02

## 2021-12-02 DIAGNOSIS — G35 MULTIPLE SCLEROSIS (HCC): ICD-10-CM

## 2021-12-02 NOTE — TELEPHONE ENCOUNTER
Kasie Vivar  Background, [place] 5 minutes ago (9:22 AM)     AS      Hello. I scheduled an appointment in February. That was the first doctor's availability. However, I have no more refil prescription for my medication.   I just want to make sure t

## 2021-12-02 NOTE — TELEPHONE ENCOUNTER
From: Monika Haider  Sent: 11/22/2021 9:22 AM CST  Subject: Refill Request    Angela Montoya,    We received your refill request for GLATIRAMER ACETATE 40 MG/ML Subcutaneous Solution Prefilled Syringe.  Also, we did see that Dr. Mary Higgins

## 2021-12-03 RX ORDER — GLATIRAMER 40 MG/ML
1 INJECTION, SOLUTION SUBCUTANEOUS
Qty: 12 EACH | Refills: 1 | Status: SHIPPED | OUTPATIENT
Start: 2021-12-03 | End: 2021-12-07

## 2021-12-07 ENCOUNTER — TELEPHONE (OUTPATIENT)
Dept: NEUROLOGY | Facility: CLINIC | Age: 36
End: 2021-12-07

## 2021-12-07 DIAGNOSIS — G35 MULTIPLE SCLEROSIS (HCC): ICD-10-CM

## 2021-12-07 RX ORDER — GLATIRAMER 40 MG/ML
1 INJECTION, SOLUTION SUBCUTANEOUS
Qty: 12 EACH | Refills: 1 | Status: SHIPPED | OUTPATIENT
Start: 2021-12-08 | End: 2021-12-14

## 2021-12-07 NOTE — TELEPHONE ENCOUNTER
pt states she is neding a refill on the Glatiramer Acetate 40 MG/ML Subcutaneous Solution Prefilled Syringe

## 2021-12-14 RX ORDER — GLATIRAMER 40 MG/ML
1 INJECTION, SOLUTION SUBCUTANEOUS
Qty: 12 EACH | Refills: 2 | Status: SHIPPED | OUTPATIENT
Start: 2021-12-15

## 2021-12-14 NOTE — TELEPHONE ENCOUNTER
Pt does not have an appt until February and not enough refills have been sent; pt will need at least 2 refills until next appt; pt would like a call to discuss; pls call her at [phone]

## 2021-12-14 NOTE — TELEPHONE ENCOUNTER
1 additional refill added to previous Rx Glatiramer sent on 12/8/21 per verbal order. Patient notified . PA will be required 1/21/2022. Will have chart go into nurse call pool at that time.

## 2021-12-20 DIAGNOSIS — G35 MULTIPLE SCLEROSIS (HCC): ICD-10-CM

## 2021-12-20 NOTE — TELEPHONE ENCOUNTER
Spoke with the pharmacy who states to disregard the patient has refills on file, this would keep an extra medication on file.        Medication: GLATIRAMER ACETATE 40 MG/ML Subcutaneous Solution Prefilled Syringe     Date of last refill: 12/15/2021 (#12 eac

## 2021-12-21 RX ORDER — GLATIRAMER 40 MG/ML
1 INJECTION, SOLUTION SUBCUTANEOUS
Qty: 12 EACH | Refills: 0 | OUTPATIENT
Start: 2021-12-22

## 2022-01-10 ENCOUNTER — PATIENT MESSAGE (OUTPATIENT)
Dept: NEUROLOGY | Facility: CLINIC | Age: 37
End: 2022-01-10

## 2022-01-19 ENCOUNTER — PATIENT MESSAGE (OUTPATIENT)
Dept: NEUROLOGY | Facility: CLINIC | Age: 37
End: 2022-01-19

## 2022-01-19 NOTE — TELEPHONE ENCOUNTER
From: Portillo Worthington  To: Kasie Garcia DO  Sent: 1/19/2022 12:35 PM CST  Subject: Breastfeeding and Glatiramer     Good afternoon. I delivered a baby yesterday, January 18th.  And I do have a question: is Glatiramer safe for the baby while breast

## 2022-01-20 ENCOUNTER — TELEPHONE (OUTPATIENT)
Dept: NEUROLOGY | Facility: CLINIC | Age: 37
End: 2022-01-20

## 2022-01-20 DIAGNOSIS — G35 MULTIPLE SCLEROSIS (HCC): Primary | ICD-10-CM

## 2022-01-20 NOTE — TELEPHONE ENCOUNTER
Received fax from [address] stating this product does not require authorization at this time.

## 2022-02-21 ENCOUNTER — TELEPHONE (OUTPATIENT)
Dept: SURGERY | Facility: CLINIC | Age: 37
End: 2022-02-21

## 2022-02-21 NOTE — TELEPHONE ENCOUNTER
Pt called to say her referral is not authorized and wanted to know self pay cost. Pt was informed to see Dr in her HMO network. Pt asked to talked to Dr Eitan Stoll. Forwarded info to my Supervisor, Lalita GREENE

## 2022-03-07 RX ORDER — GLATIRAMER 40 MG/ML
INJECTION, SOLUTION SUBCUTANEOUS
Qty: 12 EACH | Refills: 0 | Status: SHIPPED | OUTPATIENT
Start: 2022-03-07

## 2022-03-07 NOTE — TELEPHONE ENCOUNTER
I will not refill this medication any longer. This is her last refill.  She will need to be seen in clinic or will need to establish care with a different neurologist for an further care

## 2022-04-01 RX ORDER — GLATIRAMER 40 MG/ML
1 INJECTION, SOLUTION SUBCUTANEOUS
Qty: 12 EACH | Refills: 0 | OUTPATIENT
Start: 2022-04-01

## 2023-02-15 NOTE — TELEPHONE ENCOUNTER
From: Nick Hahn  To: Elsa Mora DO  Sent: 1/10/2022 5:16 PM CST  Subject: Prescription sent to the wrong pharmacy     [facility]. A prescription refill that has been sent to [address] [place] [address] was sent to the wrong pharmacy.  Please resend it to [facility] Tetracycline Counseling: Patient counseled regarding possible photosensitivity and increased risk for sunburn.  Patient instructed to avoid sunlight, if possible.  When exposed to sunlight, patients should wear protective clothing, sunglasses, and sunscreen.  The patient was instructed to call the office immediately if the following severe adverse effects occur:  hearing changes, easy bruising/bleeding, severe headache, or vision changes.  The patient verbalized understanding of the proper use and possible adverse effects of tetracycline.  All of the patient's questions and concerns were addressed. Patient understands to avoid pregnancy while on therapy due to potential birth defects.

## (undated) DIAGNOSIS — G35 MULTIPLE SCLEROSIS (HCC): ICD-10-CM

## (undated) NOTE — LETTER
2018    Patient: Jaymie Barrios   : 5/3/1985    To Whom It May Concern: The above patient is followed by me at [facility] for treatment of Multiple Sclerosis.   She takes a medication called Copaxone 40mg/mL (Glatiram

## (undated) NOTE — MR AVS SNAPSHOT
[facility], [address], [address] [place] [id number] [phone]               Thank you for choosing us for your health care visit with Jeni Sandhoff, DO.   We are glad to serve you and happy to provide you with [facility] ? Please allow the office 2-3 business days to fill the prescription. ? Patient must present photo ID at time of .     ? Written prescriptions picked up on Fridays must be picked up between the hours of 8:00 AM-1:00 PM.     Scheduling Tests    If y Vitamin D3 5000 units Caps   Take 1 capsule by mouth daily. [employer]     Sign up for girnar[employer]t, your secure online medical record.   [employer] will allow you to access patient instructions from your recent visit,  view other health information